# Patient Record
Sex: MALE | URBAN - NONMETROPOLITAN AREA
[De-identification: names, ages, dates, MRNs, and addresses within clinical notes are randomized per-mention and may not be internally consistent; named-entity substitution may affect disease eponyms.]

---

## 2022-12-12 ENCOUNTER — APPOINTMENT (RX ONLY)
Dept: URBAN - NONMETROPOLITAN AREA CLINIC 4 | Facility: CLINIC | Age: 32
Setting detail: DERMATOLOGY
End: 2022-12-12

## 2022-12-12 DIAGNOSIS — L20.89 OTHER ATOPIC DERMATITIS: ICD-10-CM | Status: INADEQUATELY CONTROLLED

## 2022-12-12 PROCEDURE — ? PRESCRIPTION

## 2022-12-12 PROCEDURE — 99204 OFFICE O/P NEW MOD 45 MIN: CPT

## 2022-12-12 PROCEDURE — ? TREATMENT REGIMEN

## 2022-12-12 PROCEDURE — ? COUNSELING

## 2022-12-12 RX ORDER — TRIAMCINOLONE ACETONIDE 1 MG/G
0.1% CREAM TOPICAL BID
Qty: 453.6 | Refills: 2 | Status: ERX | COMMUNITY
Start: 2022-12-12

## 2022-12-12 RX ORDER — LORATADINE 10 MG
10MG TABLET ORAL QD
Qty: 30 | Refills: 3 | Status: ERX | COMMUNITY
Start: 2022-12-12

## 2022-12-12 RX ADMIN — Medication 10MG: at 00:00

## 2022-12-12 RX ADMIN — TRIAMCINOLONE ACETONIDE 0.1%: 1 CREAM TOPICAL at 00:00

## 2022-12-12 ASSESSMENT — LOCATION DETAILED DESCRIPTION DERM
LOCATION DETAILED: LEFT VENTRAL PROXIMAL FOREARM
LOCATION DETAILED: RIGHT VENTRAL PROXIMAL FOREARM

## 2022-12-12 ASSESSMENT — SEVERITY ASSESSMENT 2020: SEVERITY 2020: MILD

## 2022-12-12 ASSESSMENT — LOCATION ZONE DERM: LOCATION ZONE: ARM

## 2022-12-12 ASSESSMENT — LOCATION SIMPLE DESCRIPTION DERM
LOCATION SIMPLE: RIGHT FOREARM
LOCATION SIMPLE: LEFT FOREARM

## 2022-12-12 NOTE — PROCEDURE: TREATMENT REGIMEN
Initiate Treatment: Loratadine 10mg QD\\nTriamcinolone 0.1% topical cream apply to AA on body BID
Detail Level: Zone
Plan: Will reevaluate rash if not resolved by next office visit will perform biopsy (punch).
Samples Given: Cetaphil gentle cleanser BID
Otc Regimen: Cetaphil moisturizer BID\\nBroad spectrum sunscreen SPF 50+ Q90 minutes